# Patient Record
Sex: MALE
[De-identification: names, ages, dates, MRNs, and addresses within clinical notes are randomized per-mention and may not be internally consistent; named-entity substitution may affect disease eponyms.]

---

## 2018-07-22 PROBLEM — Z00.00 ENCOUNTER FOR PREVENTIVE HEALTH EXAMINATION: Status: ACTIVE | Noted: 2018-07-22

## 2018-08-07 ENCOUNTER — APPOINTMENT (OUTPATIENT)
Dept: FAMILY MEDICINE | Facility: CLINIC | Age: 18
End: 2018-08-07

## 2021-08-27 ENCOUNTER — OUTPATIENT (OUTPATIENT)
Dept: OUTPATIENT SERVICES | Facility: HOSPITAL | Age: 21
LOS: 1 days | End: 2021-08-27
Payer: COMMERCIAL

## 2021-08-27 VITALS
TEMPERATURE: 98 F | DIASTOLIC BLOOD PRESSURE: 84 MMHG | HEART RATE: 69 BPM | WEIGHT: 261.03 LBS | RESPIRATION RATE: 16 BRPM | SYSTOLIC BLOOD PRESSURE: 124 MMHG | HEIGHT: 68 IN | OXYGEN SATURATION: 97 %

## 2021-08-27 DIAGNOSIS — Z86.69 PERSONAL HISTORY OF OTHER DISEASES OF THE NERVOUS SYSTEM AND SENSE ORGANS: ICD-10-CM

## 2021-08-27 DIAGNOSIS — Q93.51 ANGELMAN SYNDROME: ICD-10-CM

## 2021-08-27 DIAGNOSIS — Z98.890 OTHER SPECIFIED POSTPROCEDURAL STATES: Chronic | ICD-10-CM

## 2021-08-27 DIAGNOSIS — G47.30 SLEEP APNEA, UNSPECIFIED: ICD-10-CM

## 2021-08-27 PROBLEM — Z00.00 ENCOUNTER FOR PREVENTIVE HEALTH EXAMINATION: Noted: 2021-08-27

## 2021-08-27 LAB
ANION GAP SERPL CALC-SCNC: 14 MMOL/L — SIGNIFICANT CHANGE UP (ref 7–14)
BLD GP AB SCN SERPL QL: NEGATIVE — SIGNIFICANT CHANGE UP
BUN SERPL-MCNC: 13 MG/DL — SIGNIFICANT CHANGE UP (ref 7–23)
CALCIUM SERPL-MCNC: 9.4 MG/DL — SIGNIFICANT CHANGE UP (ref 8.4–10.5)
CHLORIDE SERPL-SCNC: 102 MMOL/L — SIGNIFICANT CHANGE UP (ref 98–107)
CO2 SERPL-SCNC: 22 MMOL/L — SIGNIFICANT CHANGE UP (ref 22–31)
CREAT SERPL-MCNC: 0.92 MG/DL — SIGNIFICANT CHANGE UP (ref 0.5–1.3)
GLUCOSE SERPL-MCNC: 93 MG/DL — SIGNIFICANT CHANGE UP (ref 70–99)
HCT VFR BLD CALC: 45 % — SIGNIFICANT CHANGE UP (ref 39–50)
HGB BLD-MCNC: 15.2 G/DL — SIGNIFICANT CHANGE UP (ref 13–17)
MCHC RBC-ENTMCNC: 28.2 PG — SIGNIFICANT CHANGE UP (ref 27–34)
MCHC RBC-ENTMCNC: 33.8 GM/DL — SIGNIFICANT CHANGE UP (ref 32–36)
MCV RBC AUTO: 83.5 FL — SIGNIFICANT CHANGE UP (ref 80–100)
NRBC # BLD: 0 /100 WBCS — SIGNIFICANT CHANGE UP
NRBC # FLD: 0 K/UL — SIGNIFICANT CHANGE UP
PLATELET # BLD AUTO: 355 K/UL — SIGNIFICANT CHANGE UP (ref 150–400)
POTASSIUM SERPL-MCNC: 4.1 MMOL/L — SIGNIFICANT CHANGE UP (ref 3.5–5.3)
POTASSIUM SERPL-SCNC: 4.1 MMOL/L — SIGNIFICANT CHANGE UP (ref 3.5–5.3)
RBC # BLD: 5.39 M/UL — SIGNIFICANT CHANGE UP (ref 4.2–5.8)
RBC # FLD: 12.8 % — SIGNIFICANT CHANGE UP (ref 10.3–14.5)
RH IG SCN BLD-IMP: POSITIVE — SIGNIFICANT CHANGE UP
SODIUM SERPL-SCNC: 138 MMOL/L — SIGNIFICANT CHANGE UP (ref 135–145)
WBC # BLD: 7.97 K/UL — SIGNIFICANT CHANGE UP (ref 3.8–10.5)
WBC # FLD AUTO: 7.97 K/UL — SIGNIFICANT CHANGE UP (ref 3.8–10.5)

## 2021-08-27 PROCEDURE — 93010 ELECTROCARDIOGRAM REPORT: CPT

## 2021-08-27 RX ORDER — SODIUM CHLORIDE 9 MG/ML
1000 INJECTION, SOLUTION INTRAVENOUS
Refills: 0 | Status: DISCONTINUED | OUTPATIENT
Start: 2021-09-10 | End: 2021-09-11

## 2021-08-27 NOTE — H&P PST ADULT - NEUROLOGICAL COMMENTS
hx of headaches x several years, worsening. Recent imaging detected Chiari malformation.  Scheduled for Suboccipital C1 Laminectomy for extradural Chiari decompression
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
## 2021-08-27 NOTE — H&P PST ADULT - ASSESSMENT
22 y/o male  with hx of headaches x several years, worsening. Recent imaging detected Chiari malformation.  Scheduled for Suboccipital C1 Laminectomy for extradural Chiari decompression

## 2021-08-27 NOTE — H&P PST ADULT - PROBLEM SELECTOR PLAN 1
Suboccipital C1 Laminectomy for extradural Chiari decompression    CBC BMP T&S  EKG      Preop instructions and antibacterial soap given and explained (verbal and written), with teach back.

## 2021-09-03 DIAGNOSIS — Z01.818 ENCOUNTER FOR OTHER PREPROCEDURAL EXAMINATION: ICD-10-CM

## 2021-09-04 ENCOUNTER — APPOINTMENT (OUTPATIENT)
Dept: MRI IMAGING | Facility: CLINIC | Age: 21
End: 2021-09-04

## 2021-09-04 ENCOUNTER — OUTPATIENT (OUTPATIENT)
Dept: OUTPATIENT SERVICES | Facility: HOSPITAL | Age: 21
LOS: 1 days | End: 2021-09-04
Payer: COMMERCIAL

## 2021-09-04 DIAGNOSIS — G93.5 COMPRESSION OF BRAIN: ICD-10-CM

## 2021-09-04 DIAGNOSIS — Z98.890 OTHER SPECIFIED POSTPROCEDURAL STATES: Chronic | ICD-10-CM

## 2021-09-04 PROCEDURE — 70551 MRI BRAIN STEM W/O DYE: CPT

## 2021-09-04 PROCEDURE — 72141 MRI NECK SPINE W/O DYE: CPT | Mod: 26

## 2021-09-04 PROCEDURE — 72141 MRI NECK SPINE W/O DYE: CPT

## 2021-09-04 PROCEDURE — 72148 MRI LUMBAR SPINE W/O DYE: CPT

## 2021-09-04 PROCEDURE — 72148 MRI LUMBAR SPINE W/O DYE: CPT | Mod: 26

## 2021-09-04 PROCEDURE — 70551 MRI BRAIN STEM W/O DYE: CPT | Mod: 26

## 2021-09-04 PROCEDURE — 72146 MRI CHEST SPINE W/O DYE: CPT | Mod: 26

## 2021-09-04 PROCEDURE — 72146 MRI CHEST SPINE W/O DYE: CPT

## 2021-09-07 ENCOUNTER — APPOINTMENT (OUTPATIENT)
Dept: DISASTER EMERGENCY | Facility: CLINIC | Age: 21
End: 2021-09-07

## 2021-09-08 LAB — SARS-COV-2 N GENE NPH QL NAA+PROBE: NOT DETECTED

## 2021-09-09 ENCOUNTER — TRANSCRIPTION ENCOUNTER (OUTPATIENT)
Age: 21
End: 2021-09-09

## 2021-09-10 ENCOUNTER — INPATIENT (INPATIENT)
Facility: HOSPITAL | Age: 21
LOS: 2 days | Discharge: HOME | End: 2021-09-13
Attending: NEUROLOGICAL SURGERY | Admitting: NEUROLOGICAL SURGERY
Payer: COMMERCIAL

## 2021-09-10 VITALS
OXYGEN SATURATION: 100 % | WEIGHT: 261.03 LBS | RESPIRATION RATE: 16 BRPM | HEIGHT: 68 IN | SYSTOLIC BLOOD PRESSURE: 137 MMHG | TEMPERATURE: 98 F | HEART RATE: 83 BPM | DIASTOLIC BLOOD PRESSURE: 94 MMHG

## 2021-09-10 DIAGNOSIS — Z98.890 OTHER SPECIFIED POSTPROCEDURAL STATES: Chronic | ICD-10-CM

## 2021-09-10 DIAGNOSIS — Q93.51 ANGELMAN SYNDROME: ICD-10-CM

## 2021-09-10 RX ORDER — INFLUENZA VIRUS VACCINE 15; 15; 15; 15 UG/.5ML; UG/.5ML; UG/.5ML; UG/.5ML
0.5 SUSPENSION INTRAMUSCULAR ONCE
Refills: 0 | Status: COMPLETED | OUTPATIENT
Start: 2021-09-10 | End: 2021-09-10

## 2021-09-10 RX ORDER — HYDROMORPHONE HYDROCHLORIDE 2 MG/ML
0.5 INJECTION INTRAMUSCULAR; INTRAVENOUS; SUBCUTANEOUS
Refills: 0 | Status: DISCONTINUED | OUTPATIENT
Start: 2021-09-10 | End: 2021-09-11

## 2021-09-10 RX ORDER — ONDANSETRON 8 MG/1
4 TABLET, FILM COATED ORAL ONCE
Refills: 0 | Status: DISCONTINUED | OUTPATIENT
Start: 2021-09-10 | End: 2021-09-11

## 2021-09-10 RX ORDER — FENTANYL CITRATE 50 UG/ML
50 INJECTION INTRAVENOUS
Refills: 0 | Status: DISCONTINUED | OUTPATIENT
Start: 2021-09-10 | End: 2021-09-11

## 2021-09-10 RX ORDER — DEXAMETHASONE 0.5 MG/5ML
ELIXIR ORAL
Refills: 0 | Status: DISCONTINUED | OUTPATIENT
Start: 2021-09-10 | End: 2021-09-13

## 2021-09-10 RX ORDER — OXYCODONE HYDROCHLORIDE 5 MG/1
5 TABLET ORAL EVERY 4 HOURS
Refills: 0 | Status: DISCONTINUED | OUTPATIENT
Start: 2021-09-10 | End: 2021-09-13

## 2021-09-10 RX ORDER — DIAZEPAM 5 MG
5 TABLET ORAL EVERY 8 HOURS
Refills: 0 | Status: DISCONTINUED | OUTPATIENT
Start: 2021-09-10 | End: 2021-09-11

## 2021-09-10 RX ORDER — DEXAMETHASONE 0.5 MG/5ML
3 ELIXIR ORAL EVERY 8 HOURS
Refills: 0 | Status: COMPLETED | OUTPATIENT
Start: 2021-09-10 | End: 2021-09-12

## 2021-09-10 RX ORDER — CEFAZOLIN SODIUM 1 G
2000 VIAL (EA) INJECTION EVERY 8 HOURS
Refills: 0 | Status: COMPLETED | OUTPATIENT
Start: 2021-09-10 | End: 2021-09-11

## 2021-09-10 RX ORDER — HYDROMORPHONE HYDROCHLORIDE 2 MG/ML
1 INJECTION INTRAMUSCULAR; INTRAVENOUS; SUBCUTANEOUS
Refills: 0 | Status: DISCONTINUED | OUTPATIENT
Start: 2021-09-10 | End: 2021-09-11

## 2021-09-10 RX ORDER — DEXAMETHASONE 0.5 MG/5ML
3 ELIXIR ORAL EVERY 12 HOURS
Refills: 0 | Status: DISCONTINUED | OUTPATIENT
Start: 2021-09-12 | End: 2021-09-13

## 2021-09-10 RX ORDER — OXYCODONE HYDROCHLORIDE 5 MG/1
10 TABLET ORAL EVERY 4 HOURS
Refills: 0 | Status: DISCONTINUED | OUTPATIENT
Start: 2021-09-10 | End: 2021-09-13

## 2021-09-10 RX ADMIN — FENTANYL CITRATE 50 MICROGRAM(S): 50 INJECTION INTRAVENOUS at 16:00

## 2021-09-10 RX ADMIN — Medication 5 MILLIGRAM(S): at 16:00

## 2021-09-10 RX ADMIN — OXYCODONE HYDROCHLORIDE 10 MILLIGRAM(S): 5 TABLET ORAL at 18:50

## 2021-09-10 RX ADMIN — FENTANYL CITRATE 50 MICROGRAM(S): 50 INJECTION INTRAVENOUS at 16:30

## 2021-09-10 RX ADMIN — FENTANYL CITRATE 50 MICROGRAM(S): 50 INJECTION INTRAVENOUS at 16:40

## 2021-09-10 RX ADMIN — SODIUM CHLORIDE 30 MILLILITER(S): 9 INJECTION, SOLUTION INTRAVENOUS at 13:29

## 2021-09-10 RX ADMIN — OXYCODONE HYDROCHLORIDE 10 MILLIGRAM(S): 5 TABLET ORAL at 18:20

## 2021-09-10 RX ADMIN — HYDROMORPHONE HYDROCHLORIDE 1 MILLIGRAM(S): 2 INJECTION INTRAMUSCULAR; INTRAVENOUS; SUBCUTANEOUS at 14:40

## 2021-09-10 RX ADMIN — FENTANYL CITRATE 50 MICROGRAM(S): 50 INJECTION INTRAVENOUS at 16:10

## 2021-09-10 RX ADMIN — HYDROMORPHONE HYDROCHLORIDE 1 MILLIGRAM(S): 2 INJECTION INTRAMUSCULAR; INTRAVENOUS; SUBCUTANEOUS at 15:15

## 2021-09-10 RX ADMIN — HYDROMORPHONE HYDROCHLORIDE 1 MILLIGRAM(S): 2 INJECTION INTRAMUSCULAR; INTRAVENOUS; SUBCUTANEOUS at 14:30

## 2021-09-10 RX ADMIN — HYDROMORPHONE HYDROCHLORIDE 1 MILLIGRAM(S): 2 INJECTION INTRAMUSCULAR; INTRAVENOUS; SUBCUTANEOUS at 14:50

## 2021-09-10 RX ADMIN — HYDROMORPHONE HYDROCHLORIDE 1 MILLIGRAM(S): 2 INJECTION INTRAMUSCULAR; INTRAVENOUS; SUBCUTANEOUS at 14:18

## 2021-09-10 RX ADMIN — Medication 100 MILLIGRAM(S): at 18:19

## 2021-09-10 RX ADMIN — HYDROMORPHONE HYDROCHLORIDE 1 MILLIGRAM(S): 2 INJECTION INTRAMUSCULAR; INTRAVENOUS; SUBCUTANEOUS at 15:25

## 2021-09-10 RX ADMIN — Medication 3 MILLIGRAM(S): at 18:20

## 2021-09-10 RX ADMIN — SODIUM CHLORIDE 30 MILLILITER(S): 9 INJECTION, SOLUTION INTRAVENOUS at 13:27

## 2021-09-10 NOTE — CONSULT NOTE ADULT - ATTENDING COMMENTS
Patient s/p crani and c1 laminectomy. Patient extubated doing well, hemodynamically stable. Q1 hr neurochecks, plan to possible list vs d/c tomorrow    I have personally interviewed when possible and examined the patient, reviewed data and laboratory tests/x-rays and all pertinent electronic images.  I was physically present for the key portions of the evaluation and management (E/M) service provided.   The SICU team has a constant risk benefit analyzes discussion with the primary team, all consultants, House Staff and PA's on all decisions.  These diagnoses are unrelated to the surgical procedure noted above.  I meet with family if needed to get further history, discuss the case and make care decisions for this patient who might not be able to participate.  Time involved in performance of separately billable procedures was not counted toward my critical care time. There is no overlap.  I spent 30 minutes ( 0800Hrs-0915Hrs in AM/ 1600Hrs-1715Hrs in PM, or other time indicated) of critical care time for the diagnoses, assessment, plan and interventions.  This time excludes time spent on separate procedures and teaching.

## 2021-09-10 NOTE — PROGRESS NOTE ADULT - ASSESSMENT
21M s/p SOC and C1 lami for chiari, doing well post operatively  - continue q1 checks  - TOV pending
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
## 2021-09-10 NOTE — ASU PATIENT PROFILE, ADULT - REASON FOR ADMISSION, PROFILE
ambulate often with assistance/no exercise/no heavy lifting/nothing per vagina/no tub baths/no douching/no sports/gym
I am having a laminectomy

## 2021-09-10 NOTE — PROGRESS NOTE ADULT - SUBJECTIVE AND OBJECTIVE BOX
Patient seen and examined at bedside.    --Anticoagulation--    T(C): 37 (09-10-21 @ 16:00), Max: 37 (09-10-21 @ 15:00)  HR: 87 (09-10-21 @ 16:45) (70 - 102)  BP: 139/84 (09-10-21 @ 16:45) (116/76 - 164/70)  RR: 12 (09-10-21 @ 16:45) (11 - 23)  SpO2: 97% (09-10-21 @ 16:45) (97% - 100%)  Wt(kg): --    Exam: AAOx3, FC, DAVIS strong

## 2021-09-10 NOTE — BRIEF OPERATIVE NOTE - NSICDXBRIEFPROCEDURE_GEN_ALL_CORE_FT
PROCEDURES:  Suboccipital craniectomy for Chiari malformation 10-Sep-2021 12:51:51  Gomez Mcdowell

## 2021-09-11 LAB
ANION GAP SERPL CALC-SCNC: 15 MMOL/L — HIGH (ref 7–14)
BUN SERPL-MCNC: 13 MG/DL — SIGNIFICANT CHANGE UP (ref 7–23)
CALCIUM SERPL-MCNC: 9.2 MG/DL — SIGNIFICANT CHANGE UP (ref 8.4–10.5)
CHLORIDE SERPL-SCNC: 97 MMOL/L — LOW (ref 98–107)
CO2 SERPL-SCNC: 22 MMOL/L — SIGNIFICANT CHANGE UP (ref 22–31)
CREAT SERPL-MCNC: 0.89 MG/DL — SIGNIFICANT CHANGE UP (ref 0.5–1.3)
GLUCOSE SERPL-MCNC: 146 MG/DL — HIGH (ref 70–99)
HCT VFR BLD CALC: 41.4 % — SIGNIFICANT CHANGE UP (ref 39–50)
HGB BLD-MCNC: 14.7 G/DL — SIGNIFICANT CHANGE UP (ref 13–17)
MAGNESIUM SERPL-MCNC: 2 MG/DL — SIGNIFICANT CHANGE UP (ref 1.6–2.6)
MCHC RBC-ENTMCNC: 28.9 PG — SIGNIFICANT CHANGE UP (ref 27–34)
MCHC RBC-ENTMCNC: 35.5 GM/DL — SIGNIFICANT CHANGE UP (ref 32–36)
MCV RBC AUTO: 81.5 FL — SIGNIFICANT CHANGE UP (ref 80–100)
NRBC # BLD: 0 /100 WBCS — SIGNIFICANT CHANGE UP
NRBC # FLD: 0 K/UL — SIGNIFICANT CHANGE UP
PHOSPHATE SERPL-MCNC: 3.6 MG/DL — SIGNIFICANT CHANGE UP (ref 2.5–4.5)
PLATELET # BLD AUTO: 368 K/UL — SIGNIFICANT CHANGE UP (ref 150–400)
POTASSIUM SERPL-MCNC: 4.2 MMOL/L — SIGNIFICANT CHANGE UP (ref 3.5–5.3)
POTASSIUM SERPL-SCNC: 4.2 MMOL/L — SIGNIFICANT CHANGE UP (ref 3.5–5.3)
RBC # BLD: 5.08 M/UL — SIGNIFICANT CHANGE UP (ref 4.2–5.8)
RBC # FLD: 12.6 % — SIGNIFICANT CHANGE UP (ref 10.3–14.5)
SODIUM SERPL-SCNC: 134 MMOL/L — LOW (ref 135–145)
WBC # BLD: 16.04 K/UL — HIGH (ref 3.8–10.5)
WBC # FLD AUTO: 16.04 K/UL — HIGH (ref 3.8–10.5)

## 2021-09-11 PROCEDURE — 99232 SBSQ HOSP IP/OBS MODERATE 35: CPT

## 2021-09-11 RX ORDER — DIAZEPAM 5 MG
7.5 TABLET ORAL EVERY 8 HOURS
Refills: 0 | Status: DISCONTINUED | OUTPATIENT
Start: 2021-09-11 | End: 2021-09-12

## 2021-09-11 RX ADMIN — Medication 3 MILLIGRAM(S): at 02:00

## 2021-09-11 RX ADMIN — OXYCODONE HYDROCHLORIDE 10 MILLIGRAM(S): 5 TABLET ORAL at 18:00

## 2021-09-11 RX ADMIN — Medication 100 MILLIGRAM(S): at 02:00

## 2021-09-11 RX ADMIN — SODIUM CHLORIDE 30 MILLILITER(S): 9 INJECTION, SOLUTION INTRAVENOUS at 00:52

## 2021-09-11 RX ADMIN — Medication 7.5 MILLIGRAM(S): at 22:10

## 2021-09-11 RX ADMIN — OXYCODONE HYDROCHLORIDE 10 MILLIGRAM(S): 5 TABLET ORAL at 00:52

## 2021-09-11 RX ADMIN — Medication 7.5 MILLIGRAM(S): at 13:59

## 2021-09-11 RX ADMIN — Medication 5 MILLIGRAM(S): at 00:52

## 2021-09-11 RX ADMIN — OXYCODONE HYDROCHLORIDE 10 MILLIGRAM(S): 5 TABLET ORAL at 13:00

## 2021-09-11 RX ADMIN — Medication 3 MILLIGRAM(S): at 13:58

## 2021-09-11 RX ADMIN — OXYCODONE HYDROCHLORIDE 10 MILLIGRAM(S): 5 TABLET ORAL at 07:48

## 2021-09-11 RX ADMIN — Medication 3 MILLIGRAM(S): at 22:11

## 2021-09-11 RX ADMIN — OXYCODONE HYDROCHLORIDE 10 MILLIGRAM(S): 5 TABLET ORAL at 12:01

## 2021-09-11 RX ADMIN — Medication 3 MILLIGRAM(S): at 05:47

## 2021-09-11 RX ADMIN — Medication 5 MILLIGRAM(S): at 07:48

## 2021-09-11 RX ADMIN — SODIUM CHLORIDE 30 MILLILITER(S): 9 INJECTION, SOLUTION INTRAVENOUS at 07:48

## 2021-09-11 RX ADMIN — OXYCODONE HYDROCHLORIDE 10 MILLIGRAM(S): 5 TABLET ORAL at 08:30

## 2021-09-11 RX ADMIN — OXYCODONE HYDROCHLORIDE 10 MILLIGRAM(S): 5 TABLET ORAL at 21:50

## 2021-09-11 RX ADMIN — OXYCODONE HYDROCHLORIDE 10 MILLIGRAM(S): 5 TABLET ORAL at 20:58

## 2021-09-11 RX ADMIN — OXYCODONE HYDROCHLORIDE 10 MILLIGRAM(S): 5 TABLET ORAL at 17:16

## 2021-09-11 RX ADMIN — OXYCODONE HYDROCHLORIDE 10 MILLIGRAM(S): 5 TABLET ORAL at 01:22

## 2021-09-11 NOTE — PROGRESS NOTE ADULT - ATTENDING COMMENTS
I agree with the above detailed interval history, physical, and plan, which I have reviewed and edited where appropriate; also agree with notes/assessment and of the team on service.  I have personally examined the patient, reviewed all data.  The plan is specified below:  The patient is in SICU with diagnosis mentioned in the note.    The SICU team has a constant risk benefit analyzes discussion and coordinating care with the primary team, all consultants, House Staff and PA's.  I was physically present for the key portions of the evaluation and management (E/M) service provided.  21y Male with hx Chiari malformation sp suboccipital craniectomy and C1 laminectomy  no focal deficits  HEENT  Exam:  EOMI  RESPIRATORY  Exam: clear  CARDIOVASCULAR  Exam: Regular rate and rhythm.    GI/NUTRITION  Exam: Abdomen soft, Non-tender, Non-distended  VASCULAR  Exam: Extremities warm    PLAN:   Neurologic: sp craniectomy. pain  -q1hr neuro checks  -oxycodone/tylenol prn pain, dilaudid prn -valium prn  Respiratory:   - RA  Cardiovascular:   -monitor BP/HR  Gastrointestinal/Nutrition:   -regular diet  Renal/Genitourinary:   -IVF LR @ 30ml/hr  Hematologic:   -monitor H/H  Infectious Disease:   -Ancef   Endocrine:   -decadron   Disposition:   -d/c floors/discharge

## 2021-09-11 NOTE — PROGRESS NOTE ADULT - SUBJECTIVE AND OBJECTIVE BOX
SICU Daily Progress Note  =====================================================  Interval/Overnight Events:     - no acute events overnight    HPI:  22 y/o male  with hx of headaches x several years, worsening. Recent imaging detected Chiari malformation.  Scheduled for Suboccipital C1 Laminectomy for extradural Chiari decompression (27 Aug 2021 10:37)      Allergies: No Known Allergies      MEDICATIONS:   --------------------------------------------------------------------------------------  Neurologic Medications  diazepam    Tablet 5 milliGRAM(s) Oral every 8 hours  oxyCODONE    IR 5 milliGRAM(s) Oral every 4 hours PRN Moderate Pain (4 - 6)  oxyCODONE    IR 10 milliGRAM(s) Oral every 4 hours PRN Severe Pain (7 - 10)    Respiratory Medications    Cardiovascular Medications    Gastrointestinal Medications  lactated ringers. 1000 milliLiter(s) IV Continuous <Continuous>    Genitourinary Medications    Hematologic/Oncologic Medications  influenza   Vaccine 0.5 milliLiter(s) IntraMuscular once    Antimicrobial/Immunologic Medications    Endocrine/Metabolic Medications  dexAMETHasone  Injectable   IV Push   dexAMETHasone  Injectable 3 milliGRAM(s) IV Push every 8 hours    Topical/Other Medications    --------------------------------------------------------------------------------------    VITAL SIGNS, INS/OUTS (last 24 hours):  --------------------------------------------------------------------------------------  ((Insert SICU Vitals/Is+Os here))***  --------------------------------------------------------------------------------------      EXAM  NEUROLOGY  Exam: Normal, NAD, alert, oriented x 3, no focal deficits    HEENT  Exam: Normocephalic, atraumatic.  EOMI    RESPIRATORY  Exam: non labored breathing    CARDIOVASCULAR  Exam: S1, S2.  Regular rate and rhythm.      GI/NUTRITION  Exam: Abdomen soft, Non-tender, Non-distended  Current Diet:  advance as tolerated    VASCULAR  Exam: Extremities warm, pink, well-perfused    MUSCULOSKELETAL  Exam: All extremities moving spontaneously without limitations    SKIN:  Exam: Good skin turgor, no skin breakdown    METABOLIC/FLUIDS/ELECTROLYTES  lactated ringers. 1000 milliLiter(s) IV Continuous <Continuous>  Day #      of     ***    Tubes/Lines/Drains  ***  [x] Peripheral IV  [] Central Venous Line     	[] R	[] L	[] IJ	[] Fem	[] SC	Date Placed:   [] Arterial Line		[] R	[] L	[] Fem	[] Rad	[] Ax	Date Placed:   [] PICC		[] Midline		[] Mediport  [] Urinary Catheter		Date Placed:   [x] Necessity of urinary, arterial, and venous catheters discussed    LABS  --------------------------------------------------------------------------------------  ((Insert SICU Labs here))***  --------------------------------------------------------------------------------------    OTHER LABORATORY:     IMAGING STUDIES:   CXR:       EXAM  NEUROLOGY  Exam: Normal, NAD, alert, oriented x 3, no focal deficits    HEENT  Exam: Normocephalic, atraumatic.  EOMI    RESPIRATORY  Exam: non labored breathing    CARDIOVASCULAR  Exam: S1, S2.  Regular rate and rhythm.      GI/NUTRITION  Exam: Abdomen soft, Non-tender, Non-distended  Current Diet:  advance as tolerated    VASCULAR  Exam: Extremities warm, pink, well-perfused    MUSCULOSKELETAL  Exam: All extremities moving spontaneously without limitations    SKIN:  Exam: Good skin turgor, no skin breakdown    METABOLIC/FLUIDS/ELECTROLYTES  lactated ringers. 1000 milliLiter(s) IV Continuous <Continuous>      HEMATOLOGIC  [x] DVT Prophylaxis:   Transfusions:	[] PRBC	[] Platelets		[] FFP	[] Cryoprecipitate    ------------------------------------------------------------    Critical Care Diagnoses: SICU Daily Progress Note  =====================================================  Interval/Overnight Events:     - no acute events overnight    HPI:  22 y/o male  with hx of headaches x several years, worsening. Recent imaging detected Chiari malformation.  Scheduled for Suboccipital C1 Laminectomy for extradural Chiari decompression (27 Aug 2021 10:37)      Allergies: No Known Allergies      MEDICATIONS:   --------------------------------------------------------------------------------------  Neurologic Medications  diazepam    Tablet 5 milliGRAM(s) Oral every 8 hours  oxyCODONE    IR 5 milliGRAM(s) Oral every 4 hours PRN Moderate Pain (4 - 6)  oxyCODONE    IR 10 milliGRAM(s) Oral every 4 hours PRN Severe Pain (7 - 10)    Respiratory Medications    Cardiovascular Medications    Gastrointestinal Medications  lactated ringers. 1000 milliLiter(s) IV Continuous <Continuous>    Genitourinary Medications    Hematologic/Oncologic Medications  influenza   Vaccine 0.5 milliLiter(s) IntraMuscular once    Antimicrobial/Immunologic Medications    Endocrine/Metabolic Medications  dexAMETHasone  Injectable   IV Push   dexAMETHasone  Injectable 3 milliGRAM(s) IV Push every 8 hours    Topical/Other Medications    --------------------------------------------------------------------------------------    VITAL SIGNS, INS/OUTS (last 24 hours):  --------------------------------------------------------------------------------------  ((Insert SICU Vitals/Is+Os here))***  --------------------------------------------------------------------------------------      EXAM  NEUROLOGY  Exam: Normal, NAD, alert, oriented x 3, no focal deficits    HEENT  Exam: Normocephalic, atraumatic.  EOMI    RESPIRATORY  Exam: non labored breathing    CARDIOVASCULAR  Exam: S1, S2.  Regular rate and rhythm.      GI/NUTRITION  Exam: Abdomen soft, Non-tender, Non-distended  Current Diet:  advance as tolerated    VASCULAR  Exam: Extremities warm, pink, well-perfused    MUSCULOSKELETAL  Exam: All extremities moving spontaneously without limitations    SKIN:  Exam: Good skin turgor, no skin breakdown    METABOLIC/FLUIDS/ELECTROLYTES  lactated ringers. 1000 milliLiter(s) IV Continuous <Continuous>  Day #      of     ***    Tubes/Lines/Drains  ***  [x] Peripheral IV  [] Central Venous Line     	[] R	[] L	[] IJ	[] Fem	[] SC	Date Placed:   [] Arterial Line		[] R	[] L	[] Fem	[] Rad	[] Ax	Date Placed:   [] PICC		[] Midline		[] Mediport  [] Urinary Catheter		Date Placed:   [x] Necessity of urinary, arterial, and venous catheters discussed    LABS  --------------------------------------------------------------------------------------  ((Insert SICU Labs here))***  --------------------------------------------------------------------------------------    OTHER LABORATORY:     IMAGING STUDIES:   CXR:     ASSESSMENT:  21y Male with hx Chiari malformation for which underwent scheduled suboccipital craniectomy and C1 laminectomy    PLAN:   Neurologic:   -q1hr neuro checks  -oxycodone/tylenol prn pain, dilaudid prn x4 initial post op pain control   -valium prn    Respiratory:   -extubated on NC, wean as tolerated    Cardiovascular:   -monitor BP/HR    Gastrointestinal/Nutrition:   -adv diet as tolerated    Renal/Genitourinary:   -IVL when tolerate diet  -monitor I&Os    Hematologic:   -monitor H/H    Infectious Disease:   -Ancef x2 doses post op    Endocrine:   -decadron taper    Disposition: SICU, likely dc tomorrow  SICU Daily Progress Note  =====================================================  Interval/Overnight Events:     - no acute events overnight    HPI:  20 y/o male  with hx of headaches x several years, worsening. Recent imaging detected Chiari malformation.  Scheduled for Suboccipital C1 Laminectomy for extradural Chiari decompression (27 Aug 2021 10:37)      Allergies: No Known Allergies      MEDICATIONS:   --------------------------------------------------------------------------------------  Neurologic Medications  diazepam    Tablet 5 milliGRAM(s) Oral every 8 hours  oxyCODONE    IR 5 milliGRAM(s) Oral every 4 hours PRN Moderate Pain (4 - 6)  oxyCODONE    IR 10 milliGRAM(s) Oral every 4 hours PRN Severe Pain (7 - 10)    Respiratory Medications    Cardiovascular Medications    Gastrointestinal Medications  lactated ringers. 1000 milliLiter(s) IV Continuous <Continuous>    Genitourinary Medications    Hematologic/Oncologic Medications  influenza   Vaccine 0.5 milliLiter(s) IntraMuscular once    Antimicrobial/Immunologic Medications    Endocrine/Metabolic Medications  dexAMETHasone  Injectable   IV Push   dexAMETHasone  Injectable 3 milliGRAM(s) IV Push every 8 hours    Topical/Other Medications    --------------------------------------------------------------------------------------    VITAL SIGNS, INS/OUTS (last 24 hours):  --------------------------------------------------------------------------------------  ((Insert SICU Vitals/Is+Os here))***  --------------------------------------------------------------------------------------      EXAM  NEUROLOGY  Exam: Normal, NAD, alert, oriented x 3, no focal deficits    HEENT  Exam: Normocephalic, atraumatic.  EOMI    RESPIRATORY  Exam: non labored breathing    CARDIOVASCULAR  Exam: S1, S2.  Regular rate and rhythm.      GI/NUTRITION  Exam: Abdomen soft, Non-tender, Non-distended  Current Diet:  advance as tolerated    VASCULAR  Exam: Extremities warm, pink, well-perfused    MUSCULOSKELETAL  Exam: All extremities moving spontaneously without limitations    SKIN:  Exam: Good skin turgor, no skin breakdown    METABOLIC/FLUIDS/ELECTROLYTES  lactated ringers. 1000 milliLiter(s) IV Continuous <Continuous>  Day #      of     ***    Tubes/Lines/Drains  ***  [x] Peripheral IV  [] Central Venous Line     	[] R	[] L	[] IJ	[] Fem	[] SC	Date Placed:   [] Arterial Line		[] R	[] L	[] Fem	[] Rad	[] Ax	Date Placed:   [] PICC		[] Midline		[] Mediport  [] Urinary Catheter		Date Placed:   [x] Necessity of urinary, arterial, and venous catheters discussed    LABS  --------------------------------------------------------------------------------------  ((Insert SICU Labs here))***  --------------------------------------------------------------------------------------    OTHER LABORATORY:     IMAGING STUDIES:   CXR:     ASSESSMENT:  21y Male with hx Chiari malformation for which underwent scheduled suboccipital craniectomy and C1 laminectomy    PLAN:   Neurologic:   -q1hr neuro checks  -oxycodone/tylenol prn pain, dilaudid prn x4 initial post op pain control   -valium prn    Respiratory:   -extubated on NC, wean as tolerated    Cardiovascular:   -monitor BP/HR    Gastrointestinal/Nutrition:   -adv diet as tolerated    Renal/Genitourinary:   -IVL when tolerate diet  -monitor I&Os    Hematologic:   -monitor H/H    Infectious Disease:   -Ancef x2 doses post op    Endocrine:   -decadron taper    Disposition: SICU SICU Daily Progress Note  =====================================================  Interval/Overnight Events:     - no acute events overnight    HPI:  20 y/o male  with hx of headaches x several years, worsening. Recent imaging detected Chiari malformation.  Scheduled for Suboccipital C1 Laminectomy for extradural Chiari decompression (27 Aug 2021 10:37)      Allergies: No Known Allergies      MEDICATIONS:   --------------------------------------------------------------------------------------  Neurologic Medications  diazepam    Tablet 5 milliGRAM(s) Oral every 8 hours  oxyCODONE    IR 5 milliGRAM(s) Oral every 4 hours PRN Moderate Pain (4 - 6)  oxyCODONE    IR 10 milliGRAM(s) Oral every 4 hours PRN Severe Pain (7 - 10)    Respiratory Medications    Cardiovascular Medications    Gastrointestinal Medications  lactated ringers. 1000 milliLiter(s) IV Continuous <Continuous>    Genitourinary Medications    Hematologic/Oncologic Medications  influenza   Vaccine 0.5 milliLiter(s) IntraMuscular once    Antimicrobial/Immunologic Medications    Endocrine/Metabolic Medications  dexAMETHasone  Injectable   IV Push   dexAMETHasone  Injectable 3 milliGRAM(s) IV Push every 8 hours    Topical/Other Medications    --------------------------------------------------------------------------------------    VITAL SIGNS, INS/OUTS (last 24 hours):  --------------------------------------------------------------------------------------  ((Insert SICU Vitals/Is+Os here))***  --------------------------------------------------------------------------------------      EXAM  NEUROLOGY  Exam: Normal, NAD, alert, oriented x 3, no focal deficits    HEENT  Exam: Normocephalic, atraumatic.  EOMI    RESPIRATORY  Exam: non labored breathing    CARDIOVASCULAR  Exam: S1, S2.  Regular rate and rhythm.      GI/NUTRITION  Exam: Abdomen soft, Non-tender, Non-distended  Current Diet:  advance as tolerated    VASCULAR  Exam: Extremities warm, pink, well-perfused    MUSCULOSKELETAL  Exam: All extremities moving spontaneously without limitations    SKIN:  Exam: Good skin turgor, no skin breakdown    METABOLIC/FLUIDS/ELECTROLYTES  lactated ringers. 1000 milliLiter(s) IV Continuous <Continuous>  Day #      of     ***    Tubes/Lines/Drains  ***  [x] Peripheral IV  [] Central Venous Line     	[] R	[] L	[] IJ	[] Fem	[] SC	Date Placed:   [] Arterial Line		[] R	[] L	[] Fem	[] Rad	[] Ax	Date Placed:   [] PICC		[] Midline		[] Mediport  [] Urinary Catheter		Date Placed:   [x] Necessity of urinary, arterial, and venous catheters discussed    LABS:  Reviewed    IMAGING STUDIES:   Reviewed

## 2021-09-11 NOTE — PROGRESS NOTE ADULT - ASSESSMENT
ASSESSMENT:  21y Male with hx Chiari malformation for which underwent scheduled suboccipital craniectomy and C1 laminectomy    Interval events:  - no acute events overnight  - tolerating regular diet  - listed for floors today    PLAN:   Neurologic:   -q1hr neuro checks  -oxycodone/tylenol prn pain, dilaudid prn x4 initial post op pain control   -valium prn    Respiratory:   -satting well on RA    Cardiovascular:   -monitor BP/HR    Gastrointestinal/Nutrition:   -regular diet    Renal/Genitourinary:   -IVF LR @ 30ml/hr  -monitor I&Os    Hematologic:   -monitor H/H    Infectious Disease:   -Ancef x2 doses post op    Endocrine:   -decadron taper    Disposition:   -listed for floors   ASSESSMENT:  21y Male with hx Chiari malformation for which underwent scheduled suboccipital craniectomy and C1 laminectomy    Interval events:  - no acute events overnight  - tolerating regular diet  - listed for floors/discharge today    PLAN:   Neurologic:   -q1hr neuro checks  -oxycodone/tylenol prn pain, dilaudid prn x4 initial post op pain control   -valium prn    Respiratory:   -satting well on RA    Cardiovascular:   -monitor BP/HR    Gastrointestinal/Nutrition:   -regular diet    Renal/Genitourinary:   -IVF LR @ 30ml/hr  -monitor I&Os    Hematologic:   -monitor H/H    Infectious Disease:   -Ancef x2 doses post op    Endocrine:   -decadron taper    Disposition:   -listed for floors/discharge

## 2021-09-12 ENCOUNTER — TRANSCRIPTION ENCOUNTER (OUTPATIENT)
Age: 21
End: 2021-09-12

## 2021-09-12 DIAGNOSIS — Z98.890 OTHER SPECIFIED POSTPROCEDURAL STATES: ICD-10-CM

## 2021-09-12 LAB
ANION GAP SERPL CALC-SCNC: 9 MMOL/L — SIGNIFICANT CHANGE UP (ref 7–14)
BUN SERPL-MCNC: 14 MG/DL — SIGNIFICANT CHANGE UP (ref 7–23)
CALCIUM SERPL-MCNC: 9.1 MG/DL — SIGNIFICANT CHANGE UP (ref 8.4–10.5)
CHLORIDE SERPL-SCNC: 103 MMOL/L — SIGNIFICANT CHANGE UP (ref 98–107)
CO2 SERPL-SCNC: 22 MMOL/L — SIGNIFICANT CHANGE UP (ref 22–31)
COVID-19 SPIKE DOMAIN AB INTERP: NEGATIVE — SIGNIFICANT CHANGE UP
COVID-19 SPIKE DOMAIN ANTIBODY RESULT: 0.4 U/ML — SIGNIFICANT CHANGE UP
CREAT SERPL-MCNC: 0.75 MG/DL — SIGNIFICANT CHANGE UP (ref 0.5–1.3)
GLUCOSE SERPL-MCNC: 117 MG/DL — HIGH (ref 70–99)
HCT VFR BLD CALC: 41.9 % — SIGNIFICANT CHANGE UP (ref 39–50)
HGB BLD-MCNC: 14.8 G/DL — SIGNIFICANT CHANGE UP (ref 13–17)
MAGNESIUM SERPL-MCNC: 2.3 MG/DL — SIGNIFICANT CHANGE UP (ref 1.6–2.6)
MCHC RBC-ENTMCNC: 29.4 PG — SIGNIFICANT CHANGE UP (ref 27–34)
MCHC RBC-ENTMCNC: 35.3 GM/DL — SIGNIFICANT CHANGE UP (ref 32–36)
MCV RBC AUTO: 83.3 FL — SIGNIFICANT CHANGE UP (ref 80–100)
NRBC # BLD: 0 /100 WBCS — SIGNIFICANT CHANGE UP
NRBC # FLD: 0 K/UL — SIGNIFICANT CHANGE UP
PHOSPHATE SERPL-MCNC: 3.8 MG/DL — SIGNIFICANT CHANGE UP (ref 2.5–4.5)
PLATELET # BLD AUTO: 324 K/UL — SIGNIFICANT CHANGE UP (ref 150–400)
POTASSIUM SERPL-MCNC: 4.3 MMOL/L — SIGNIFICANT CHANGE UP (ref 3.5–5.3)
POTASSIUM SERPL-SCNC: 4.3 MMOL/L — SIGNIFICANT CHANGE UP (ref 3.5–5.3)
RBC # BLD: 5.03 M/UL — SIGNIFICANT CHANGE UP (ref 4.2–5.8)
RBC # FLD: 13.2 % — SIGNIFICANT CHANGE UP (ref 10.3–14.5)
SARS-COV-2 IGG+IGM SERPL QL IA: 0.4 U/ML — SIGNIFICANT CHANGE UP
SARS-COV-2 IGG+IGM SERPL QL IA: NEGATIVE — SIGNIFICANT CHANGE UP
SODIUM SERPL-SCNC: 134 MMOL/L — LOW (ref 135–145)
WBC # BLD: 14.69 K/UL — HIGH (ref 3.8–10.5)
WBC # FLD AUTO: 14.69 K/UL — HIGH (ref 3.8–10.5)

## 2021-09-12 RX ORDER — DIAZEPAM 5 MG
1 TABLET ORAL
Qty: 15 | Refills: 0
Start: 2021-09-12 | End: 2021-09-16

## 2021-09-12 RX ORDER — OXYCODONE HYDROCHLORIDE 5 MG/1
1 TABLET ORAL
Qty: 12 | Refills: 0
Start: 2021-09-12 | End: 2021-09-14

## 2021-09-12 RX ORDER — DEXAMETHASONE 0.5 MG/5ML
3 ELIXIR ORAL
Qty: 25 | Refills: 0
Start: 2021-09-12 | End: 2021-09-17

## 2021-09-12 RX ORDER — ACETAMINOPHEN 500 MG
650 TABLET ORAL EVERY 6 HOURS
Refills: 0 | Status: DISCONTINUED | OUTPATIENT
Start: 2021-09-12 | End: 2021-09-12

## 2021-09-12 RX ORDER — ACETAMINOPHEN 500 MG
650 TABLET ORAL EVERY 6 HOURS
Refills: 0 | Status: DISCONTINUED | OUTPATIENT
Start: 2021-09-12 | End: 2021-09-13

## 2021-09-12 RX ORDER — DIAZEPAM 5 MG
2 TABLET ORAL
Qty: 30 | Refills: 0
Start: 2021-09-12 | End: 2021-09-16

## 2021-09-12 RX ORDER — OXYCODONE HYDROCHLORIDE 5 MG/1
2 TABLET ORAL
Qty: 40 | Refills: 0
Start: 2021-09-12 | End: 2021-09-16

## 2021-09-12 RX ORDER — DIAZEPAM 5 MG
10 TABLET ORAL EVERY 8 HOURS
Refills: 0 | Status: DISCONTINUED | OUTPATIENT
Start: 2021-09-12 | End: 2021-09-13

## 2021-09-12 RX ADMIN — Medication 650 MILLIGRAM(S): at 23:54

## 2021-09-12 RX ADMIN — OXYCODONE HYDROCHLORIDE 10 MILLIGRAM(S): 5 TABLET ORAL at 09:24

## 2021-09-12 RX ADMIN — OXYCODONE HYDROCHLORIDE 10 MILLIGRAM(S): 5 TABLET ORAL at 01:26

## 2021-09-12 RX ADMIN — OXYCODONE HYDROCHLORIDE 10 MILLIGRAM(S): 5 TABLET ORAL at 21:15

## 2021-09-12 RX ADMIN — Medication 3 MILLIGRAM(S): at 17:35

## 2021-09-12 RX ADMIN — OXYCODONE HYDROCHLORIDE 10 MILLIGRAM(S): 5 TABLET ORAL at 15:10

## 2021-09-12 RX ADMIN — OXYCODONE HYDROCHLORIDE 10 MILLIGRAM(S): 5 TABLET ORAL at 01:56

## 2021-09-12 RX ADMIN — OXYCODONE HYDROCHLORIDE 10 MILLIGRAM(S): 5 TABLET ORAL at 09:54

## 2021-09-12 RX ADMIN — OXYCODONE HYDROCHLORIDE 10 MILLIGRAM(S): 5 TABLET ORAL at 14:41

## 2021-09-12 RX ADMIN — OXYCODONE HYDROCHLORIDE 10 MILLIGRAM(S): 5 TABLET ORAL at 20:19

## 2021-09-12 RX ADMIN — Medication 3 MILLIGRAM(S): at 05:50

## 2021-09-12 RX ADMIN — Medication 10 MILLIGRAM(S): at 15:31

## 2021-09-12 RX ADMIN — Medication 7.5 MILLIGRAM(S): at 05:34

## 2021-09-12 NOTE — DISCHARGE NOTE PROVIDER - HOSPITAL COURSE
20 y/o male  with hx of headaches x several years, worsening. Recent imaging detected Chiari malformation. Same day admission 9/10 for Suboccipital C1 Laminectomy for extradural Chiari decompression. Pt admitted to SICU post op for q1H neuro checks. Continued to do well. Transferred to the floor. Now stable for d/c home

## 2021-09-12 NOTE — DISCHARGE NOTE PROVIDER - NSDCCPTREATMENT_GEN_ALL_CORE_FT
PRINCIPAL PROCEDURE  Procedure: Suboccipital craniectomy for Chiari malformation  Findings and Treatment:

## 2021-09-12 NOTE — PROGRESS NOTE ADULT - SUBJECTIVE AND OBJECTIVE BOX
No issues overnight  Vital Signs Last 24 Hrs  T(C): 36.8 (12 Sep 2021 00:39), Max: 36.9 (11 Sep 2021 20:00)  T(F): 98.3 (12 Sep 2021 00:39), Max: 98.4 (11 Sep 2021 20:00)  HR: 71 (12 Sep 2021 00:39) (61 - 109)  BP: 143/77 (12 Sep 2021 00:39) (123/75 - 143/81)  BP(mean): 90 (11 Sep 2021 20:00) (71 - 94)  RR: 18 (12 Sep 2021 00:39) (17 - 25)  SpO2: 98% (12 Sep 2021 00:39) (93% - 98%)    AAO X 3  PERRLA, EOMI  CN 2-12 grossly intact  DAVIS strength 5/5  SILT    MEDICATIONS  (STANDING):  dexAMETHasone  Injectable   IV Push   dexAMETHasone  Injectable 3 milliGRAM(s) IV Push every 8 hours  dexAMETHasone  Injectable 3 milliGRAM(s) IV Push every 12 hours  diazepam    Tablet 7.5 milliGRAM(s) Oral every 8 hours  influenza   Vaccine 0.5 milliLiter(s) IntraMuscular once    MEDICATIONS  (PRN):  oxyCODONE    IR 5 milliGRAM(s) Oral every 4 hours PRN Moderate Pain (4 - 6)  oxyCODONE    IR 10 milliGRAM(s) Oral every 4 hours PRN Severe Pain (7 - 10)                          14.7   16.04 )-----------( 368      ( 11 Sep 2021 02:26 )             41.4     09-11    134<L>  |  97<L>  |  13  ----------------------------<  146<H>  4.2   |  22  |  0.89    Ca    9.2      11 Sep 2021 02:26  Phos  3.6     09-11  Mg     2.00     09-11

## 2021-09-12 NOTE — PROGRESS NOTE ADULT - ASSESSMENT
9/12: 20 YO male POD # 2 S/P SOC, C-1 laminectomy for decompression of chiari malformation, neuro intact

## 2021-09-12 NOTE — DISCHARGE NOTE PROVIDER - NSDCFUADDINST_GEN_ALL_CORE_FT
- You had surgery on 9/10/21. The surgery you had was a suboccipital craniotomy with C1 laminectomy for extradural chiari malformation decompression.     - Remove bandage from incision site on post op day 3 if it was not removed by the surgical team prior to discharge. Incision site does not need a bandage or ointment on it. If you have steri strips, they will eventually fall off over time. Do not pull at steri strips. Do not touch incision.     - Shower daily with shampoo/soap on post operative day 4 (9/14/21) Avoid long soaks and do not submerge incision in water (no baths.) Allow soap and water to run over the incision. Pat incision area dry with clean towel- do not scrub. Please shower regularly to ensure incision stays clean to avoid post operative infections.     - Notify your surgeon if you notice increased redness, drainage or your incision area opening.     - Return to ER immediately for high fevers, severe headache, vomiting, lethargy or weakness    - Please call your neurosurgeon following discharge to make follow up appointment in 1 week after discharge unless otherwise specified. See contact information.    - Prescription post operative medication has been sent to [facility] in the hospital. All post operative prescriptions should be picked up before departing the hospital. You can also take over the counter tylenol for pain as needed.     - Ambulate as tolerate. Continue with all "activities of daily living." Avoid strenuous activity or heavy lifting until cleared for additional activity at your follow up appointment. You cannot drive while taking narcotics (oxy, valium, etc.)     - Do not return to work or school until cleared by your neurosurgeon at your follow up visit unless specified to you during your hospital stay    - You have staples/sutures that will be removed in the office at your follow up appointment.

## 2021-09-12 NOTE — DISCHARGE NOTE PROVIDER - NSDCCPCAREPLAN_GEN_ALL_CORE_FT
PRINCIPAL DISCHARGE DIAGNOSIS  Diagnosis: History of Chiari malformation  Assessment and Plan of Treatment: Do not drive while taking narcotics or pain medications.   Do not lift more than 5-10 lbs.   You may shower or shampoo your incision 4 days after surgery (Monday 9/14). You can let soap and water run on it and pat it dry.   Keep incision clean and dry and do not use any ointments.   Report to the ED for persistent fever, vomiting, headaches not relieved by medications, drainage from the incision or any change in neurologic or mental status or seizures.   Call the office to make a follow up appointment in 1 week.  Your staples will be removed in the office         PRINCIPAL DISCHARGE DIAGNOSIS  Diagnosis: History of Chiari malformation  Assessment and Plan of Treatment:

## 2021-09-12 NOTE — DISCHARGE NOTE PROVIDER - NSDCACTIVITY_GEN_ALL_CORE
Do not drive or operate machinery/Showering allowed/No heavy lifting/straining Do not drive or operate machinery/Showering allowed/No heavy lifting/straining/Follow Instructions Provided by your Surgical Team

## 2021-09-12 NOTE — DISCHARGE NOTE PROVIDER - CARE PROVIDER_API CALL
Miller Galvez)  Neurosurgery; Pediatric Neurosurgery  [address] [place] [address], [address]  [place], [place] [id number]  Phone: [phone]  Fax: [phone]  Follow Up Time: 1 week

## 2021-09-12 NOTE — DISCHARGE NOTE PROVIDER - NSDCMRMEDTOKEN_GEN_ALL_CORE_FT
dexamethasone 1 mg oral tablet: 3 tab(s) orally every 8 hours x1 day  2mg q8H x 1 day  2mg q12H x 1 day  1mg q12H x 1 day  1mg q day x 2 days  then stop  diazePAM 5 mg oral tablet: 1 tab(s) orally every 8 hours MDD:3 tabs  melatonin: 1 tab(s) orally once a day. last dsoe 8/26/21  oxyCODONE 5 mg oral tablet: 1 tab(s) orally every 6 hours, As Needed -Moderate Pain (4 - 6) MDD:4 tabs   acetaminophen 325 mg oral tablet: 2 tab(s) orally every 6 hours, As needed, Temp greater or equal to 38C (100.4F), Mild Pain (1 - 3)  dexamethasone 1 mg oral tablet: 3mg Q 12 hr x 1 day, 2mg Q 12 hr x 2 days, 1mg Q 12hr x 2 days, 1mg Q 24 hr x 1 day then off   melatonin: 1 tab(s) orally once a day. last dsoe 8/26/21  oxyCODONE 5 mg oral capsule: 1 cap(s) orally every 6 hours as needed for pain MDD:4  polyethylene glycol 3350 oral powder for reconstitution: 17 gram(s) orally once a day  senna oral tablet: 2 tab(s) orally once a day (at bedtime)  Valium 5 mg oral tablet: 1 tab(s) orally every 8 hours as needed for neck spasms MDD:3

## 2021-09-13 ENCOUNTER — TRANSCRIPTION ENCOUNTER (OUTPATIENT)
Age: 21
End: 2021-09-13

## 2021-09-13 VITALS
SYSTOLIC BLOOD PRESSURE: 134 MMHG | TEMPERATURE: 98 F | HEART RATE: 97 BPM | DIASTOLIC BLOOD PRESSURE: 79 MMHG | RESPIRATION RATE: 18 BRPM | OXYGEN SATURATION: 98 %

## 2021-09-13 RX ORDER — DEXAMETHASONE 0.5 MG/5ML
1 ELIXIR ORAL EVERY 12 HOURS
Refills: 0 | Status: CANCELLED | OUTPATIENT
Start: 2021-09-16 | End: 2021-09-13

## 2021-09-13 RX ORDER — DIAZEPAM 5 MG
1 TABLET ORAL
Qty: 15 | Refills: 0
Start: 2021-09-13 | End: 2021-09-17

## 2021-09-13 RX ORDER — SENNA PLUS 8.6 MG/1
2 TABLET ORAL AT BEDTIME
Refills: 0 | Status: DISCONTINUED | OUTPATIENT
Start: 2021-09-13 | End: 2021-09-13

## 2021-09-13 RX ORDER — OXYCODONE HYDROCHLORIDE 5 MG/1
1 TABLET ORAL
Qty: 12 | Refills: 0
Start: 2021-09-13 | End: 2021-09-15

## 2021-09-13 RX ORDER — SENNA PLUS 8.6 MG/1
2 TABLET ORAL
Qty: 10 | Refills: 0
Start: 2021-09-13 | End: 2021-09-17

## 2021-09-13 RX ORDER — DEXAMETHASONE 0.5 MG/5ML
1 ELIXIR ORAL
Qty: 19 | Refills: 0
Start: 2021-09-13 | End: 2021-09-19

## 2021-09-13 RX ORDER — POLYETHYLENE GLYCOL 3350 17 G/17G
17 POWDER, FOR SOLUTION ORAL DAILY
Refills: 0 | Status: DISCONTINUED | OUTPATIENT
Start: 2021-09-13 | End: 2021-09-13

## 2021-09-13 RX ORDER — POLYETHYLENE GLYCOL 3350 17 G/17G
17 POWDER, FOR SOLUTION ORAL
Qty: 85 | Refills: 0
Start: 2021-09-13 | End: 2021-09-17

## 2021-09-13 RX ORDER — DEXAMETHASONE 0.5 MG/5ML
2 ELIXIR ORAL EVERY 12 HOURS
Refills: 0 | Status: DISCONTINUED | OUTPATIENT
Start: 2021-09-14 | End: 2021-09-13

## 2021-09-13 RX ORDER — DEXAMETHASONE 0.5 MG/5ML
1 ELIXIR ORAL EVERY 24 HOURS
Refills: 0 | Status: CANCELLED | OUTPATIENT
Start: 2021-09-19 | End: 2021-09-13

## 2021-09-13 RX ORDER — DEXAMETHASONE 0.5 MG/5ML
ELIXIR ORAL
Refills: 0 | Status: DISCONTINUED | OUTPATIENT
Start: 2021-09-13 | End: 2021-09-13

## 2021-09-13 RX ORDER — DEXAMETHASONE 0.5 MG/5ML
3 ELIXIR ORAL EVERY 12 HOURS
Refills: 0 | Status: DISCONTINUED | OUTPATIENT
Start: 2021-09-13 | End: 2021-09-13

## 2021-09-13 RX ORDER — ACETAMINOPHEN 500 MG
2 TABLET ORAL
Qty: 0 | Refills: 0 | DISCHARGE
Start: 2021-09-13

## 2021-09-13 RX ADMIN — OXYCODONE HYDROCHLORIDE 5 MILLIGRAM(S): 5 TABLET ORAL at 07:00

## 2021-09-13 RX ADMIN — OXYCODONE HYDROCHLORIDE 5 MILLIGRAM(S): 5 TABLET ORAL at 12:00

## 2021-09-13 RX ADMIN — Medication 10 MILLIGRAM(S): at 08:16

## 2021-09-13 RX ADMIN — POLYETHYLENE GLYCOL 3350 17 GRAM(S): 17 POWDER, FOR SOLUTION ORAL at 10:33

## 2021-09-13 RX ADMIN — OXYCODONE HYDROCHLORIDE 5 MILLIGRAM(S): 5 TABLET ORAL at 01:30

## 2021-09-13 RX ADMIN — OXYCODONE HYDROCHLORIDE 5 MILLIGRAM(S): 5 TABLET ORAL at 06:03

## 2021-09-13 RX ADMIN — Medication 650 MILLIGRAM(S): at 00:50

## 2021-09-13 RX ADMIN — Medication 3 MILLIGRAM(S): at 06:03

## 2021-09-13 RX ADMIN — OXYCODONE HYDROCHLORIDE 5 MILLIGRAM(S): 5 TABLET ORAL at 00:49

## 2021-09-13 RX ADMIN — OXYCODONE HYDROCHLORIDE 5 MILLIGRAM(S): 5 TABLET ORAL at 13:00

## 2021-09-13 NOTE — DISCHARGE NOTE NURSING/CASE MANAGEMENT/SOCIAL WORK - NSDCPEWEB_GEN_ALL_CORE
[facility] for Tobacco Control website --- [email or web address]

## 2021-09-13 NOTE — SBIRT NOTE ADULT - NSSBIRTNALOXDUR_GEN_A_CORE
Pt. declined to answer questions, reports he "drinks socially" and does not take drugs. Positive reinforcement/support provided.

## 2021-09-13 NOTE — PROGRESS NOTE ADULT - PROBLEM SELECTOR PLAN 1
Neuro checks Q4H  Increase activity  pain control  Discharge planning after PT does stairs    d/w attending

## 2021-09-13 NOTE — PHYSICAL THERAPY INITIAL EVALUATION ADULT - GENERAL OBSERVATIONS, REHAB EVAL
Patient received semi supine in bed , (+) staples on neck posteriorly , (+) flat effect , answers questions , (+) obese ,mom is at bedside.

## 2021-09-13 NOTE — PROGRESS NOTE ADULT - SUBJECTIVE AND OBJECTIVE BOX
No issues overnight    Vital Signs Last 24 Hrs  T(C): 36.6 (13 Sep 2021 02:02), Max: 37 (12 Sep 2021 14:21)  T(F): 97.8 (13 Sep 2021 02:02), Max: 98.6 (12 Sep 2021 14:21)  HR: 60 (13 Sep 2021 02:02) (57 - 83)  BP: 116/79 (13 Sep 2021 02:02) (116/79 - 145/81)  BP(mean): --  ABP: --  ABP(mean): --  RR: 16 (13 Sep 2021 02:02) (16 - 18)  SpO2: 100% (13 Sep 2021 02:02) (97% - 100%)      AAO X 3  PERRLA, EOMI  CN 2-12 grossly intact  DAVIS strength 5/5  SILT  incision clean, dry, no dc noted    MEDICATIONS  (STANDING):  dexAMETHasone  Injectable   IV Push   dexAMETHasone  Injectable 3 milliGRAM(s) IV Push every 12 hours  influenza   Vaccine 0.5 milliLiter(s) IntraMuscular once      MEDICATIONS  (PRN):  oxyCODONE    IR 5 milliGRAM(s) Oral every 4 hours PRN Moderate Pain (4 - 6)  oxyCODONE    IR 10 milliGRAM(s) Oral every 4 hours PRN Severe Pain (7 - 10)                                           14.8   14.69 )-----------( 324      ( 12 Sep 2021 07:05 )             41.9     09-12    134<L>  |  103  |  14  ----------------------------<  117<H>  4.3   |  22  |  0.75    Ca    9.1      12 Sep 2021 07:05  Phos  3.8     09-12  Mg     2.30     09-12

## 2021-09-13 NOTE — DISCHARGE NOTE NURSING/CASE MANAGEMENT/SOCIAL WORK - PATIENT PORTAL LINK FT
You can access the FollowMyHealth Patient Portal offered by [facility] by registering at the following website: [email or web address]. By joining [employer]’s FollowMyHealth portal, you will also be able to view your health information using other applications (apps) compatible with our system.

## 2021-09-13 NOTE — PHYSICAL THERAPY INITIAL EVALUATION ADULT - ADDITIONAL COMMENTS
Stair training not performed due to pt feeling lightheaded or mild dizziness , /57 , Mom and son are not concerned about stairs to go Home, TING Hartley and neurosurgery made aware.

## 2021-09-13 NOTE — PROGRESS NOTE ADULT - ASSESSMENT
9/12: 20 YO male POD # 2 S/P SOC, C-1 laminectomy for decompression of chiari malformation, neuro intact  9/13: pod 3, neuro exam stable, PT to do stairs today, on decadron taper, pain control w/ valium.

## 2021-09-13 NOTE — PHYSICAL THERAPY INITIAL EVALUATION ADULT - LIVES WITH, PROFILE
Mom at home, 3 steps to enter , mom said her son could stay in the main level if not another 6 steps down to his room,

## 2021-10-27 ENCOUNTER — APPOINTMENT (OUTPATIENT)
Dept: MRI IMAGING | Facility: CLINIC | Age: 21
End: 2021-10-27
Payer: COMMERCIAL

## 2021-10-27 ENCOUNTER — OUTPATIENT (OUTPATIENT)
Dept: OUTPATIENT SERVICES | Facility: HOSPITAL | Age: 21
LOS: 1 days | End: 2021-10-27
Payer: COMMERCIAL

## 2021-10-27 DIAGNOSIS — G93.5 COMPRESSION OF BRAIN: ICD-10-CM

## 2021-10-27 DIAGNOSIS — Z00.8 ENCOUNTER FOR OTHER GENERAL EXAMINATION: ICD-10-CM

## 2021-10-27 DIAGNOSIS — Z98.890 OTHER SPECIFIED POSTPROCEDURAL STATES: Chronic | ICD-10-CM

## 2021-10-27 PROCEDURE — 70551 MRI BRAIN STEM W/O DYE: CPT | Mod: 26

## 2021-10-27 PROCEDURE — 70551 MRI BRAIN STEM W/O DYE: CPT

## 2022-01-25 RX ORDER — LANOLIN ALCOHOL/MO/W.PET/CERES
1 CREAM (GRAM) TOPICAL
Qty: 0 | Refills: 0 | DISCHARGE

## 2022-04-28 PROBLEM — E66.9 OBESITY, UNSPECIFIED: Chronic | Status: ACTIVE | Noted: 2021-08-27

## 2022-04-28 PROBLEM — G47.30 SLEEP APNEA, UNSPECIFIED: Chronic | Status: ACTIVE | Noted: 2021-08-27

## 2022-05-05 ENCOUNTER — APPOINTMENT (OUTPATIENT)
Dept: MRI IMAGING | Facility: CLINIC | Age: 22
End: 2022-05-05
Payer: COMMERCIAL

## 2022-05-05 ENCOUNTER — OUTPATIENT (OUTPATIENT)
Dept: OUTPATIENT SERVICES | Facility: HOSPITAL | Age: 22
LOS: 1 days | End: 2022-05-05
Payer: COMMERCIAL

## 2022-05-05 DIAGNOSIS — G93.5 COMPRESSION OF BRAIN: ICD-10-CM

## 2022-05-05 DIAGNOSIS — Z98.890 OTHER SPECIFIED POSTPROCEDURAL STATES: Chronic | ICD-10-CM

## 2022-05-05 PROCEDURE — 70551 MRI BRAIN STEM W/O DYE: CPT | Mod: 26

## 2022-05-05 PROCEDURE — 70551 MRI BRAIN STEM W/O DYE: CPT

## 2023-03-09 NOTE — H&P PST ADULT - RS GEN PE MLT RESP DETAILS PC
Consent (Lip)/Introductory Paragraph: The rationale for Mohs was explained to the patient and consent was obtained. The risks, benefits and alternatives to therapy were discussed in detail. Specifically, the risks of lip deformity, changes in the oral aperture, infection, scarring, bleeding, prolonged wound healing, incomplete removal, allergy to anesthesia, nerve injury and recurrence were addressed. Prior to the procedure, the treatment site was clearly identified and confirmed by the patient. All components of Universal Protocol/PAUSE Rule completed. respirations non-labored/clear to auscultation bilaterally/no rales/no rhonchi/no wheezes

## 2024-06-11 ENCOUNTER — APPOINTMENT (OUTPATIENT)
Dept: MRI IMAGING | Facility: CLINIC | Age: 24
End: 2024-06-11
Payer: COMMERCIAL

## 2024-06-11 ENCOUNTER — OUTPATIENT (OUTPATIENT)
Dept: OUTPATIENT SERVICES | Facility: HOSPITAL | Age: 24
LOS: 1 days | End: 2024-06-11
Payer: COMMERCIAL

## 2024-06-11 DIAGNOSIS — Z98.890 OTHER SPECIFIED POSTPROCEDURAL STATES: Chronic | ICD-10-CM

## 2024-06-11 DIAGNOSIS — Z00.8 ENCOUNTER FOR OTHER GENERAL EXAMINATION: ICD-10-CM

## 2024-06-11 PROCEDURE — 72141 MRI NECK SPINE W/O DYE: CPT | Mod: 26

## 2024-06-11 PROCEDURE — 70551 MRI BRAIN STEM W/O DYE: CPT | Mod: 26

## 2024-06-11 PROCEDURE — 70551 MRI BRAIN STEM W/O DYE: CPT

## 2024-06-11 PROCEDURE — 72141 MRI NECK SPINE W/O DYE: CPT

## 2025-05-12 NOTE — PATIENT PROFILE ADULT - SAFE PLACE TO LIVE
Medication:   Requested Prescriptions     Pending Prescriptions Disp Refills    folic acid (FOLVITE) 1 MG tablet [Pharmacy Med Name: FOLIC ACID 1 MG TABLET] 40 tablet 0     Sig: TAKE 1 TABLET BY MOUTH THREE TIMES A WEEK.        Last Filled:  2/17/25    Patient Phone Number: [phone] (home)     Last appt: 3/26/2025   Next appt: 5/28/2025    Last OARRS:       1/4/2023    12:27 PM   RX Monitoring   Periodic Controlled Substance Monitoring No signs of potential drug abuse or diversion identified.          no